# Patient Record
(demographics unavailable — no encounter records)

---

## 2024-10-08 NOTE — PHYSICAL EXAM
[General Appearance - Well Developed] : well developed [Normal Appearance] : normal appearance [Well Groomed] : well groomed [General Appearance - Well Nourished] : well nourished [No Deformities] : no deformities [General Appearance - In No Acute Distress] : no acute distress [Normal Conjunctiva] : the conjunctiva exhibited no abnormalities [Eyelids - No Xanthelasma] : the eyelids demonstrated no xanthelasmas [Normal Oral Mucosa] : normal oral mucosa [No Oral Pallor] : no oral pallor [No Oral Cyanosis] : no oral cyanosis [Normal Jugular Venous A Waves Present] : normal jugular venous A waves present [Normal Jugular Venous V Waves Present] : normal jugular venous V waves present [No Jugular Venous Dumont A Waves] : no jugular venous dumont A waves [Respiration, Rhythm And Depth] : normal respiratory rhythm and effort [Exaggerated Use Of Accessory Muscles For Inspiration] : no accessory muscle use [Auscultation Breath Sounds / Voice Sounds] : lungs were clear to auscultation bilaterally [Heart Rate And Rhythm] : heart rate and rhythm were normal [Heart Sounds] : normal S1 and S2 [Murmurs] : no murmurs present [Bowel Sounds] : normal bowel sounds [Abdomen Soft] : soft [Abdomen Tenderness] : non-tender [Abnormal Walk] : normal gait [Gait - Sufficient For Exercise Testing] : the gait was sufficient for exercise testing [Nail Clubbing] : no clubbing of the fingernails [Cyanosis, Localized] : no localized cyanosis [Petechial Hemorrhages (___cm)] : no petechial hemorrhages [Skin Color & Pigmentation] : normal skin color and pigmentation [] : no rash [No Venous Stasis] : no venous stasis [Skin Lesions] : no skin lesions [No Skin Ulcers] : no skin ulcer [No Xanthoma] : no  xanthoma was observed [Oriented To Time, Place, And Person] : oriented to person, place, and time [Affect] : the affect was normal [Mood] : the mood was normal [No Anxiety] : not feeling anxious [FreeTextEntry1] : full ROM

## 2024-10-08 NOTE — HISTORY OF PRESENT ILLNESS
[FreeTextEntry1] : 56 yo M with HLD, mild LEEANNE, elevated hgb, 2021, gastritis/ulcer and celiac artery dissection.  10/2024 VISIT: no angina. ekg well. hgb 17.7  10/2023: no interim hospitalizations. He has been to neurology for pains in his head and ringing in his ears. Is waiting for test results. He states he has pain in his left arm occasionally. He occurs both at rest or with activity. He was told that he didn't qualify for a CPAP machine.  10/2022: no interim hospitalizations. 3 months ago saw GI and vascular surgery. reports having h pylori.   2021: not able to get CPAP from sleep medicine he reports due to cost. CV Symptoms resolved. TTE grossly unremarkable. TST no ischemia. 14-day Zio patch grossly unremarkable. Heme work up reviewed.  ** TESTING I REVIEWED TODAY excluding above:  11/2021 LABWORK: cmp.   labwork 9/2021: hgb 17.6, normal cmp. CTA a/p with celiac artery dissection  TST 3/2021 no ischemia  labs 3/2021: hgb 17.8 Labs 1/18/21: alt 55. hgb 18.6. jak2 neg. mild LEEANNE EKG: grossly normal 12/28/20: Hgb 18+. TFTs normal. Gluc 124. Cr 0.8. ALT 41 (ULN 40).  TTE grossly unremarkable. 14-day Zio patch grossly unremarkable.

## 2024-10-08 NOTE — DISCUSSION/SUMMARY
[FreeTextEntry1] : 56 yo M with HTN, HLD, mild LEEANNE, elevated hgb, 2021 gastritis/ulcer and celiac artery dissection.  His echocardiogram and treadmill testing are unremarkable. We are optimizing cv risk factors.  not able to get CPAP from sleep medicine due to cost. Of note, his Hgb is elevated because of LEEANNE as per his self-report. He needs lipid profile serially.   He sees GI. I reviewed vascular surgery work up deeming abdominal pain unrelated given extensive collateral circulation.   Follow yearly with cv labwork from pcp. ER precautions given to patient.

## 2024-10-08 NOTE — HISTORY OF PRESENT ILLNESS
[FreeTextEntry1] : 54 yo M with HLD, mild LEEANNE, elevated hgb, 2021, gastritis/ulcer and celiac artery dissection.  10/2024 VISIT: no angina. ekg well. hgb 17.7  10/2023: no interim hospitalizations. He has been to neurology for pains in his head and ringing in his ears. Is waiting for test results. He states he has pain in his left arm occasionally. He occurs both at rest or with activity. He was told that he didn't qualify for a CPAP machine.  10/2022: no interim hospitalizations. 3 months ago saw GI and vascular surgery. reports having h pylori.   2021: not able to get CPAP from sleep medicine he reports due to cost. CV Symptoms resolved. TTE grossly unremarkable. TST no ischemia. 14-day Zio patch grossly unremarkable. Heme work up reviewed.  ** TESTING I REVIEWED TODAY excluding above:  11/2021 LABWORK: cmp.   labwork 9/2021: hgb 17.6, normal cmp. CTA a/p with celiac artery dissection  TST 3/2021 no ischemia  labs 3/2021: hgb 17.8 Labs 1/18/21: alt 55. hgb 18.6. jak2 neg. mild LEEANNE EKG: grossly normal 12/28/20: Hgb 18+. TFTs normal. Gluc 124. Cr 0.8. ALT 41 (ULN 40).  TTE grossly unremarkable. 14-day Zio patch grossly unremarkable.

## 2024-10-08 NOTE — DISCUSSION/SUMMARY
[FreeTextEntry1] : 54 yo M with HTN, HLD, mild LEEANNE, elevated hgb, 2021 gastritis/ulcer and celiac artery dissection.  His echocardiogram and treadmill testing are unremarkable. We are optimizing cv risk factors.  not able to get CPAP from sleep medicine due to cost. Of note, his Hgb is elevated because of LEEANNE as per his self-report. He needs lipid profile serially.   He sees GI. I reviewed vascular surgery work up deeming abdominal pain unrelated given extensive collateral circulation.   Follow yearly with cv labwork from pcp. ER precautions given to patient.

## 2024-10-08 NOTE — PHYSICAL EXAM
[General Appearance - Well Developed] : well developed [Normal Appearance] : normal appearance [Well Groomed] : well groomed [General Appearance - Well Nourished] : well nourished [No Deformities] : no deformities [General Appearance - In No Acute Distress] : no acute distress [Normal Conjunctiva] : the conjunctiva exhibited no abnormalities [Eyelids - No Xanthelasma] : the eyelids demonstrated no xanthelasmas [Normal Oral Mucosa] : normal oral mucosa [No Oral Pallor] : no oral pallor [No Oral Cyanosis] : no oral cyanosis [Normal Jugular Venous A Waves Present] : normal jugular venous A waves present [Normal Jugular Venous V Waves Present] : normal jugular venous V waves present [No Jugular Venous Dumont A Waves] : no jugular venous dumont A waves [Respiration, Rhythm And Depth] : normal respiratory rhythm and effort [Exaggerated Use Of Accessory Muscles For Inspiration] : no accessory muscle use [Auscultation Breath Sounds / Voice Sounds] : lungs were clear to auscultation bilaterally [Heart Rate And Rhythm] : heart rate and rhythm were normal [Heart Sounds] : normal S1 and S2 [Murmurs] : no murmurs present [Bowel Sounds] : normal bowel sounds [Abdomen Soft] : soft [Abdomen Tenderness] : non-tender [Abnormal Walk] : normal gait [Gait - Sufficient For Exercise Testing] : the gait was sufficient for exercise testing [Nail Clubbing] : no clubbing of the fingernails [Cyanosis, Localized] : no localized cyanosis [Petechial Hemorrhages (___cm)] : no petechial hemorrhages [Skin Color & Pigmentation] : normal skin color and pigmentation [] : no rash [No Venous Stasis] : no venous stasis [Skin Lesions] : no skin lesions [No Skin Ulcers] : no skin ulcer [No Xanthoma] : no  xanthoma was observed [Oriented To Time, Place, And Person] : oriented to person, place, and time [Mood] : the mood was normal [Affect] : the affect was normal [No Anxiety] : not feeling anxious [FreeTextEntry1] : full ROM

## 2024-10-08 NOTE — REASON FOR VISIT
[Symptom and Test Evaluation] : symptom and test evaluation [CV Risk Factors and Non-Cardiac Disease] : CV risk factors and non-cardiac disease [Follow-Up - Clinic] : a clinic follow-up of [Chest Pain] : chest pain [Hyperlipidemia] : hyperlipidemia [Medication Management] : Medication management [Palpitations] : palpitations [FreeTextEntry3] : Dr. Roby Odonnell